# Patient Record
Sex: FEMALE | Race: WHITE
[De-identification: names, ages, dates, MRNs, and addresses within clinical notes are randomized per-mention and may not be internally consistent; named-entity substitution may affect disease eponyms.]

---

## 2019-07-10 ENCOUNTER — HOSPITAL ENCOUNTER (OUTPATIENT)
Dept: HOSPITAL 61 - KCIC MRI | Age: 24
Discharge: HOME | End: 2019-07-10
Attending: ORTHOPAEDIC SURGERY
Payer: COMMERCIAL

## 2019-07-10 DIAGNOSIS — M25.471: ICD-10-CM

## 2019-07-10 DIAGNOSIS — S83.421D: Primary | ICD-10-CM
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
## 2019-07-10 PROCEDURE — 73721 MRI JNT OF LWR EXTRE W/O DYE: CPT

## 2019-07-10 NOTE — [facility]
MR of the right ankle

 

HISTORY: Right ankle sprain. Injury last January with continued pain. 

Swelling and instability.

 

TECHNIQUE: Routine multiplanar sequences are obtained.

 

FINDINGS:

Paranasal tendons are intact.

 

Mild ill-definition of the anterior talofibular and calcaneofibular 

ligament compatible with a mild sprain but no rupture or discontinuity. 

Posterior tibial tendon intact. Inferior tibiofibular ligaments are 

intact.

 

Posterior tibial and flexor tendons are intact. Medial ligamentous 

structures appear intact.

 

Anterior tibial and extensor tendons are intact. Achilles tendon intact. 

No acute plantar fasciitis. Subtalar joints and tarsal sinus are intact.

 

There is broadening and close continuity of the anterior calcaneus with 

the lateral navicular bone compatible with a fibrocartilaginous coalition.

Acute intense marrow edema within the navicular bone and within the distal

talus. Mild adjacent soft tissue edema dorsally as well as a small 

talonavicular joint effusion.

 

Milder subchondral marrow edema at the posterior subtalar joint. No 

evidence of discrete fracture line.

 

IMPRESSION:

1. Fibrocartilaginous calcaneonavicular coalition.

2. Acute intense bone marrow edema of the navicular and the distal talus 

could be related to posttraumatic contusions or stress fractures. This 

could also be secondary to biomechanical stress related to the 

calcaneonavicular coalition.

3. Milder subchondral marrow edema marginating the posterior subtalar 

joint, also likely due to biomechanical stress or contusions.

4. Mild lateral collateral ligament sprain.

 

Electronically signed by: Dennis Solis MD (7/10/2019 4:01 PM) [facility]

## 2019-09-20 ENCOUNTER — HOSPITAL ENCOUNTER (OUTPATIENT)
Dept: HOSPITAL 61 - KCIC CT | Age: 24
Discharge: HOME | End: 2019-09-20
Attending: ORTHOPAEDIC SURGERY
Payer: COMMERCIAL

## 2019-09-20 DIAGNOSIS — M25.771: Primary | ICD-10-CM

## 2019-09-20 PROCEDURE — 73700 CT LOWER EXTREMITY W/O DYE: CPT

## 2019-09-20 NOTE — [facility]
EXAM: AP, lateral and coronal views of the right ankle

 

DATE: 9/20/2019 10:30 AM

 

INDICATION: Right ankle pain. Calcaneonavicular coalition. Stiffness, 

ankle sprain

 

COMPARISON: No Prior

 

FINDINGS:

There is no evidence for acute fracture or dislocation. Fibrous or 

cartilaginous calcaneonavicular coalition with associated sclerosis and 

subchondral cystic change. No ankle joint effusion.

 

Talonavicular and naviculocuneiform degenerative changes are seen with 

small osteophytes. Achilles tendon silhouette is intact.

 

IMPRESSION:

1.  Calcaneonavicular coalition, fibrous or cartilaginous.

2.  No evidence for acute fracture.

 

Electronically signed by: Dani Hanson MD (9/20/2019 3:33 PM) [facility]

## 2019-12-25 ENCOUNTER — HOSPITAL ENCOUNTER (EMERGENCY)
Dept: HOSPITAL 61 - ER | Age: 24
Discharge: HOME | End: 2019-12-25
Payer: COMMERCIAL

## 2019-12-25 VITALS — DIASTOLIC BLOOD PRESSURE: 74 MMHG | SYSTOLIC BLOOD PRESSURE: 124 MMHG

## 2019-12-25 VITALS — BODY MASS INDEX: 28.93 KG/M2 | HEIGHT: 66 IN | WEIGHT: 180 LBS

## 2019-12-25 DIAGNOSIS — R10.31: Primary | ICD-10-CM

## 2019-12-25 DIAGNOSIS — Z98.890: ICD-10-CM

## 2019-12-25 DIAGNOSIS — R63.0: ICD-10-CM

## 2019-12-25 LAB
ALBUMIN SERPL-MCNC: 3.8 G/DL (ref 3.4–5)
ALBUMIN/GLOB SERPL: 0.8 {RATIO} (ref 1–1.7)
ALP SERPL-CCNC: 65 U/L (ref 46–116)
ALT SERPL-CCNC: 12 U/L (ref 14–59)
ANION GAP SERPL CALC-SCNC: 11 MMOL/L (ref 6–14)
APTT PPP: (no result) S
AST SERPL-CCNC: 19 U/L (ref 15–37)
BACTERIA #/AREA URNS HPF: (no result) /HPF
BASOPHILS # BLD AUTO: 0.1 X10^3/UL (ref 0–0.2)
BASOPHILS NFR BLD: 1 % (ref 0–3)
BILIRUB SERPL-MCNC: 0.3 MG/DL (ref 0.2–1)
BILIRUB UR QL STRIP: NEGATIVE
BUN SERPL-MCNC: 9 MG/DL (ref 7–20)
BUN/CREAT SERPL: 11 (ref 6–20)
CALCIUM SERPL-MCNC: 8.9 MG/DL (ref 8.5–10.1)
CHLORIDE SERPL-SCNC: 102 MMOL/L (ref 98–107)
CO2 SERPL-SCNC: 27 MMOL/L (ref 21–32)
CREAT SERPL-MCNC: 0.8 MG/DL (ref 0.6–1)
EOSINOPHIL NFR BLD: 0.1 X10^3/UL (ref 0–0.7)
EOSINOPHIL NFR BLD: 1 % (ref 0–3)
ERYTHROCYTE [DISTWIDTH] IN BLOOD BY AUTOMATED COUNT: 14.2 % (ref 11.5–14.5)
FIBRINOGEN PPP-MCNC: CLEAR MG/DL
GFR SERPLBLD BASED ON 1.73 SQ M-ARVRAT: 87.4 ML/MIN
GLOBULIN SER-MCNC: 4.5 G/DL (ref 2.2–3.8)
GLUCOSE SERPL-MCNC: 87 MG/DL (ref 70–99)
HCT VFR BLD CALC: 38.7 % (ref 36–47)
HGB BLD-MCNC: 13.3 G/DL (ref 12–15.5)
LYMPHOCYTES # BLD: 2.4 X10^3/UL (ref 1–4.8)
LYMPHOCYTES NFR BLD AUTO: 26 % (ref 24–48)
MCH RBC QN AUTO: 31 PG (ref 25–35)
MCHC RBC AUTO-ENTMCNC: 34 G/DL (ref 31–37)
MCV RBC AUTO: 89 FL (ref 79–100)
MONO #: 0.7 X10^3/UL (ref 0–1.1)
MONOCYTES NFR BLD: 8 % (ref 0–9)
NEUT #: 5.9 X10^3/UL (ref 1.8–7.7)
NEUTROPHILS NFR BLD AUTO: 65 % (ref 31–73)
NITRITE UR QL STRIP: POSITIVE
PH UR STRIP: 7 [PH]
PLATELET # BLD AUTO: 281 X10^3/UL (ref 140–400)
POTASSIUM SERPL-SCNC: 3.8 MMOL/L (ref 3.5–5.1)
PROT SERPL-MCNC: 8.3 G/DL (ref 6.4–8.2)
PROT UR STRIP-MCNC: NEGATIVE MG/DL
RBC # BLD AUTO: 4.36 X10^6/UL (ref 3.5–5.4)
RBC #/AREA URNS HPF: (no result) /HPF (ref 0–2)
SODIUM SERPL-SCNC: 140 MMOL/L (ref 136–145)
SQUAMOUS #/AREA URNS LPF: (no result) /LPF
UROBILINOGEN UR-MCNC: 0.2 MG/DL
WBC # BLD AUTO: 9.2 X10^3/UL (ref 4–11)
WBC #/AREA URNS HPF: (no result) /HPF (ref 0–4)

## 2019-12-25 PROCEDURE — 76705 ECHO EXAM OF ABDOMEN: CPT

## 2019-12-25 PROCEDURE — 96361 HYDRATE IV INFUSION ADD-ON: CPT

## 2019-12-25 PROCEDURE — 96374 THER/PROPH/DIAG INJ IV PUSH: CPT

## 2019-12-25 PROCEDURE — 81001 URINALYSIS AUTO W/SCOPE: CPT

## 2019-12-25 PROCEDURE — 80053 COMPREHEN METABOLIC PANEL: CPT

## 2019-12-25 PROCEDURE — 76856 US EXAM PELVIC COMPLETE: CPT

## 2019-12-25 PROCEDURE — 81025 URINE PREGNANCY TEST: CPT

## 2019-12-25 PROCEDURE — 87086 URINE CULTURE/COLONY COUNT: CPT

## 2019-12-25 PROCEDURE — 76830 TRANSVAGINAL US NON-OB: CPT

## 2019-12-25 PROCEDURE — 99285 EMERGENCY DEPT VISIT HI MDM: CPT

## 2019-12-25 PROCEDURE — 36415 COLL VENOUS BLD VENIPUNCTURE: CPT

## 2019-12-25 PROCEDURE — 74177 CT ABD & PELVIS W/CONTRAST: CPT

## 2019-12-25 PROCEDURE — 85025 COMPLETE CBC W/AUTO DIFF WBC: CPT

## 2019-12-25 NOTE — RAD
Examination: CT ABD PELV W/ IV CONTRST ONLY

 

History: Right lower quadrant abdominal pain

 

Comparison/Correlation: None

 

Findings: Axial images of the abdomen and pelvis were obtained following 

IV contrast. Sagittal and coronal reformatted images were provided.

 

Visualized lung bases are clear. Liver, spleen, pancreas, adrenal glands, 

and kidneys are normal. Urinary bladder is unremarkable. No hydronephrosis

or hydroureter. No radiopaque collecting system calculus identified.

 

Moderate quantity of stool in the colon noted. No extraluminal gas. No 

bowel obstruction. Small umbilical hernia containing omental fat. No 

ascites. Trace pelvic free fluid is present. Intrauterine device noted.

 

 

Impression:

Appendix is normal. No inflammatory process about the cecum.

 

Trace pelvic free fluid which is presumably physiologic.

 

 

PQRS Compliance Statement:

 

One or more of the following individualized dose reduction techniques were

utilized for this examination:  

1. Automated exposure control  

2. Adjustment of the mA and/or kV according to patient size  

3. Use of iterative reconstruction technique

 

Electronically signed by: Ismael Seymour MD (12/25/2019 7:05 PM) [facility]

## 2019-12-25 NOTE — RAD
Examination: ABDOMEN LTD

 

History: Right lower quadrant pain for the past 3 days

 

Comparison/Correlation: None

 

Findings: Limited right upper quadrant ultrasound exam was performed.

 

Hepatic echotexture is unremarkable. Portal venous flow is unremarkable. 

No biliary dilatation. Common bile duct is unremarkable. Gallbladder is 

contracted with wall thickness of 0.4 cm. No pericholecystic fluid. No 

evidence of calculus involvement. Right kidney measures 10.5 cm x 4.6 cm x

4.5 cm. No right hydronephrosis. Proximal pancreas is unremarkable. Distal

pancreas is obscured by bowel gas. Inferior vena cava is unremarkable. No 

right upper quadrant ascites. Appendix is not delineated in the right 

lower quadrant.

 

 

Impression:

Gallbladder is decompressed. This likely accounts for the mild gallbladder

wall thickening. No definite suspicious process.

 

No appendix delineated in the right lower quadrant. Correlate clinically 

in order to determine further assessment if appendicitis is a persistent 

concern.

 

Electronically signed by: Ismael Seymour MD (12/25/2019 6:06 PM) [facility]

## 2019-12-25 NOTE — PHYS DOC
Past Medical History


Past Medical History:  No Pertinent History


 (JILLIAN GONSALES MD)


Past Surgical History:  Other


Additional Past Surgical Histo:  R.ANKLE


 (JILLIAN GONSALES MD)


Alcohol Use:  Occasionally


Drug Use:  None


 (JILLIAN GONSALES MD)





Adult General


Chief Complaint


Chief Complaint:  ABDOMINAL PAIN





[facility]


HPI





Patient is a 23  year old patient without history of medical problem who 

presents with complaint of abdominal pain. Patient complaining of right lower 

quadrant pain for the last 2 days as a constant sharp pain and rated her pain 

5/10 without radiation. Patient denies increasing of the pain for the last 3 

days. Patient complaining of mild anorexia without nausea and vomiting, 

constipation and diarrhea, urinary symptoms, fever and chills, vaginal discha

rge. Patient started her menstruation 3 days ago. Patient states she had bowel 

movement yesterday without change of her pain. Patient states she took Tylenol 

and ibuprofen prior to arrival to ER with improvement of the pain.


 (JILLIAN GONSALES MD)





Review of Systems


Review of Systems





Constitutional: Denies fever or chills []


Eyes: Denies change in visual acuity, redness, or eye pain []


HENT: Denies nasal congestion or sore throat []


Respiratory: Denies cough or shortness of breath []


Cardiovascular: No additional information not addressed in HPI []


GI: Reports abdominal pain, denies nausea, vomiting, bloody stools or diarrhea 

[]


: Denies dysuria or hematuria []


Musculoskeletal: Denies back pain or joint pain []


Integument: Denies rash or skin lesions []


Neurologic: Denies headache, focal weakness or sensory changes []


Endocrine: Denies polyuria or polydipsia []





All other systems were reviewed and found to be within normal limits, except as 

documented in this note.


 (JILLIAN GONSALES MD)





Current Medications


Current Medications





Current Medications








 Medications


  (Trade)  Dose


 Ordered  Sig/Earnestine  Start Time


 Stop Time Status Last Admin


Dose Admin


 


 Info


  (CONTRAST GIVEN


 -- Rx MONITORING)  1 each  PRN DAILY  PRN  19 19:00


 19 18:59   





 


 Iohexol


  (Omnipaque 300


 Mg/ml)  75 ml  1X  ONCE  19 18:45


 19 18:46 DC 19 18:51


75 ML


 


 Orphenadrine


 Citrate


  (Norflex)  60 mg  1X  ONCE  19 18:00


 19 18:01 DC 19 17:59


60 MG


 


 Sodium Chloride  1,000 ml @ 


 1,000 mls/hr  Q1H  19 16:59


 19 17:58 DC 19 17:12


1,000 MLS/HR





 (EREN LOCKE MD)





Allergies


Allergies





Allergies








Coded Allergies Type Severity Reaction Last Updated Verified


 


  No Known Drug Allergies    19 No





 (EREN LOCKE MD)





Physical Exam


Physical Exam








Constitutional: Well developed, well nourished, mild distress, non-toxic 

appearance. []


HENT: Normocephalic, atraumatic.


Eyes: PERRLA, EOMI, conjunctiva normal, no discharge. [] 


Neck: Normal range of motion, no tenderness, supple, no stridor. [] 


Cardiovascular:Heart rate regular rhythm, no murmur []


Lungs & Thorax:  Bilateral breath sounds clear to auscultation []


Abdomen: Bowel sounds normal, soft, no tenderness, right lower quadrant mild 

guarding to deep touch without rebound tenderness, no masses, no pulsatile 

masses. [] 


Skin: Warm, dry, no erythema, no rash. [] 


Back: No tenderness, no CVA tenderness. [] 


Extremities: No tenderness, no cyanosis, no clubbing, ROM intact, no edema. [] 


Neurologic: Alert and oriented X 3, no focal deficits noted. []


Psychologic: Affect normal, judgement normal, mood normal. []


 (JILLIAN GONSALES MD)





Current Patient Data


Vital Signs





                                   Vital Signs








  Date Time  Temp Pulse Resp B/P (MAP) Pulse Ox O2 Delivery O2 Flow Rate FiO2


 


19 17:08  64 18 112/65 (81) 97 Room Air  


 


19 16:47 97.8       





 97.8       





 (EREN LOCKE MD)


Lab Values





                                Laboratory Tests








Test


 19


16:45 19


16:55 19


16:59


 


Urine Collection Type Unknown    


 


Urine Color Caridad    


 


Urine Clarity Clear    


 


Urine pH 7.0    


 


Urine Specific Gravity <=1.005    


 


Urine Protein


 Negative mg/dL


(NEG-TRACE) 


 





 


Urine Glucose (UA)


 Negative mg/dL


(NEG) 


 





 


Urine Ketones (Stick)


 Negative mg/dL


(NEG) 


 





 


Urine Blood Large (NEG)    


 


Urine Nitrite


 Positive (NEG)


 


 





 


Urine Bilirubin


 Negative (NEG)


 


 





 


Urine Urobilinogen Dipstick


 0.2 mg/dL (0.2


mg/dL) 


 





 


Urine Leukocyte Esterase Trace (NEG)    


 


Urine RBC


 Occ /HPF (0-2)


 


 





 


Urine WBC


 1-4 /HPF (0-4)


 


 





 


Urine Squamous Epithelial


Cells Mod /LPF  


 


 





 


Urine Bacteria


 Few /HPF


(0-FEW) 


 





 


White Blood Count


 


 9.2 x10^3/uL


(4.0-11.0) 





 


Red Blood Count


 


 4.36 x10^6/uL


(3.50-5.40) 





 


Hemoglobin


 


 13.3 g/dL


(12.0-15.5) 





 


Hematocrit


 


 38.7 %


(36.0-47.0) 





 


Mean Corpuscular Volume


 


 89 fL ()


 





 


Mean Corpuscular Hemoglobin  31 pg (25-35)   


 


Mean Corpuscular Hemoglobin


Concent 


 34 g/dL


(31-37) 





 


Red Cell Distribution Width


 


 14.2 %


(11.5-14.5) 





 


Platelet Count


 


 281 x10^3/uL


(140-400) 





 


Neutrophils (%) (Auto)  65 % (31-73)   


 


Lymphocytes (%) (Auto)  26 % (24-48)   


 


Monocytes (%) (Auto)  8 % (0-9)   


 


Eosinophils (%) (Auto)  1 % (0-3)   


 


Basophils (%) (Auto)  1 % (0-3)   


 


Neutrophils # (Auto)


 


 5.9 x10^3/uL


(1.8-7.7) 





 


Lymphocytes # (Auto)


 


 2.4 x10^3/uL


(1.0-4.8) 





 


Monocytes # (Auto)


 


 0.7 x10^3/uL


(0.0-1.1) 





 


Eosinophils # (Auto)


 


 0.1 x10^3/uL


(0.0-0.7) 





 


Basophils # (Auto)


 


 0.1 x10^3/uL


(0.0-0.2) 





 


Sodium Level


 


 140 mmol/L


(136-145) 





 


Potassium Level


 


 3.8 mmol/L


(3.5-5.1) 





 


Chloride Level


 


 102 mmol/L


() 





 


Carbon Dioxide Level


 


 27 mmol/L


(21-32) 





 


Anion Gap  11 (6-14)   


 


Blood Urea Nitrogen


 


 9 mg/dL (7-20)


 





 


Creatinine


 


 0.8 mg/dL


(0.6-1.0) 





 


Estimated GFR


(Cockcroft-Gault) 


 87.4  


 





 


BUN/Creatinine Ratio  11 (6-20)   


 


Glucose Level


 


 87 mg/dL


(70-99) 





 


Calcium Level


 


 8.9 mg/dL


(8.5-10.1) 





 


Total Bilirubin


 


 0.3 mg/dL


(0.2-1.0) 





 


Aspartate Amino Transferase


(AST) 


 19 U/L (15-37)


 





 


Alanine Aminotransferase (ALT)


 


 12 U/L (14-59)


L 





 


Alkaline Phosphatase


 


 65 U/L


() 





 


Total Protein


 


 8.3 g/dL


(6.4-8.2)  H 





 


Albumin


 


 3.8 g/dL


(3.4-5.0) 





 


Albumin/Globulin Ratio


 


 0.8 (1.0-1.7)


L 





 


POC Urine HCG, Qualitative


 


 


 Hcg negative


(Negative)





                                Laboratory Tests


19 16:55








                                Laboratory Tests


19 16:55








 (EREN LOCKE MD)





EKG


EKG


[]


 (JILLIAN GONSALES MD)





Radiology/Procedures


Radiology/Procedures


[]


 (JILLIAN GONSALES MD)


Radiology/Procedures


[facility]


                    [address]  [place], [place] [id number] [phone]


                                        


                                 IMAGING REPORT





                                     Signed





PATIENT: JASON GAGNON  ACCOUNT: [id number]     MRN#: [id number]


: 1995           LOCATION: ER              AGE: 23


SEX: F                    EXAM DT: 19         ACCESSION#: [id number]


STATUS: REG ER            ORD. PHYSICIAN: EREN LOCKE MD


REASON: Right lower quad abdominal pain, US wihtout visualization of appendix


PROCEDURE: CT ABD PELV W/ IV CONTRST ONLY





Examination: CT ABD PELV W/ IV CONTRST ONLY


 


History: Right lower quadrant abdominal pain


 


Comparison/Correlation: None


 


Findings: Axial images of the abdomen and pelvis were obtained following 


IV contrast. Sagittal and coronal reformatted images were provided.


 


Visualized lung bases are clear. Liver, spleen, pancreas, adrenal glands, 


and kidneys are normal. Urinary bladder is unremarkable. No hydronephrosis


or hydroureter. No radiopaque collecting system calculus identified.


 


Moderate quantity of stool in the colon noted. No extraluminal gas. No 


bowel obstruction. Small umbilical hernia containing omental fat. No 


ascites. Trace pelvic free fluid is present. Intrauterine device noted.


 


 


Impression:


Appendix is normal. No inflammatory process about the cecum.


 


Trace pelvic free fluid which is presumably physiologic.


 


 


RS Compliance Statement:


 


One or more of the following individualized dose reduction techniques were


utilized for this examination:  


1. Automated exposure control  


2. Adjustment of the mA and/or kV according to patient size  


3. Use of iterative reconstruction technique


 


Electronically signed by: Ismael Langford MD (2019 7:05 PM) [facility] [place]














DICTATED and SIGNED BY:     ISMAEL LANGFORD MD


DATE:     19





 (EREN LOCKE MD)





Course & Med Decision Making


Course & Med Decision Making


Pertinent Labs and Imaging studies are pending.





Evaluation of patient in ER showed 23-year-old female patient with complaining 

of right lower quadrant pain for the last 3 days. Patient had recent right foot 

fracture with wearing  orthopedic boot and returned to work one week ago. 

Patient had deep guarding his breakthrough quadrant exam. Labs was unremarkable.

 Ultrasound of pelvic and abdomen is pending. Sign out given to  at 

1800 for further evaluation and final disposition. Discussed current findings 

and plan with patient and family, who acknowledge understanding and agreement.


 (JILLIAN GONSALES MD)


Course & Med Decision Making


Discussed findings of US of which were unremarkable, CT reviewed without 

evidence of acute process identified.  Discussed findings with patient/family at

 bedside. Recommend dc home with follow up as needed.  


 (EREN LOCKE MD)


Dragon Disclaimer


Dragon Disclaimer


This electronic medical record was generated, in whole or in part, using a voice

 recognition dictation system.


 (JILLIAN GONSALES MD)





Departure


Departure


Impression:  


   Primary Impression:  


   Abdominal pain


Disposition:   HOME, SELF-CARE


Condition:  STABLE


Referrals:  


UNKNOWN PCP NAME (PCP)


Patient Instructions:  Abdominal Pain (Nonspecific)





Additional Instructions:  


Recommend follow up with PCP 3 - 5 days


Return to the ER with worsening symptoms, intractable pain, fever, altered 

mental status


Tylenol/Motrin as needed for pain


CT without evidence of acute process identified


Pelvic US without acute findings identified





Problem Qualifiers








   Primary Impression:  


   Abdominal pain


   Abdominal location:  right lower quadrant  Qualified Codes:  R10.31 - Right 

   lower quadrant pain








JILLIAN GONSALES MD             Dec 25, 2019 17:31


EREN LOCKE MD              Dec 25, 2019 19:08

## 2019-12-25 NOTE — RAD
Examination: PELVIS W/TV

 

History: Right lower quadrant pain

 

Comparison/Correlation: None

 

Findings: Transabdominal and transvaginal pelvic ultrasound was performed.

 

Uterus measures 8.6 x 5.2 cm x 3.4 cm. Myometrium is unremarkable. 

Intrauterine device is present. Endometrial unremarkable.

 

Right ovary measures 2.2 cm x 2.3 cm x 1.9 cm. Left ovary measures 2.5 cm 

x 2.6 x 2 cm. Right adnexal follicle measuring up to 1.3 cm diameter in 

this present. Ovarian flow bilaterally is evident and unremarkable. There 

is a small amount of pelvic free fluid.

 

 

Impression:

Small amount of pelvic free fluid. Right adnexal follicle. Findings are 

presumably physiologic. No suspicious finding.

 

Electronically signed by: Ismael Seymour MD (12/25/2019 6:09 PM) [facility]